# Patient Record
Sex: FEMALE | HISPANIC OR LATINO
[De-identification: names, ages, dates, MRNs, and addresses within clinical notes are randomized per-mention and may not be internally consistent; named-entity substitution may affect disease eponyms.]

---

## 2017-01-16 ENCOUNTER — CHARTING TRANS (OUTPATIENT)
Dept: OTHER | Age: 67
End: 2017-01-16

## 2017-01-31 ENCOUNTER — CHARTING TRANS (OUTPATIENT)
Dept: OTHER | Age: 67
End: 2017-01-31

## 2017-02-01 ENCOUNTER — CHARTING TRANS (OUTPATIENT)
Dept: OTHER | Age: 67
End: 2017-02-01

## 2017-02-02 ENCOUNTER — IMAGING SERVICES (OUTPATIENT)
Dept: OTHER | Age: 67
End: 2017-02-02

## 2017-02-02 ENCOUNTER — CHARTING TRANS (OUTPATIENT)
Dept: OTHER | Age: 67
End: 2017-02-02

## 2017-02-06 ENCOUNTER — CHARTING TRANS (OUTPATIENT)
Dept: OTHER | Age: 67
End: 2017-02-06

## 2017-02-07 ENCOUNTER — CHARTING TRANS (OUTPATIENT)
Dept: OTHER | Age: 67
End: 2017-02-07

## 2017-02-08 ENCOUNTER — LAB SERVICES (OUTPATIENT)
Dept: OTHER | Age: 67
End: 2017-02-08

## 2017-02-08 LAB
INTERNATIONAL NORMAL: 1.03 (ref 0.8–1.1)
PROTHROMBIN TIME (PATIENT): 11 SECONDS (ref 8.9–11.9)

## 2017-02-09 ENCOUNTER — CHARTING TRANS (OUTPATIENT)
Dept: OTHER | Age: 67
End: 2017-02-09

## 2017-02-13 ENCOUNTER — CHARTING TRANS (OUTPATIENT)
Dept: OTHER | Age: 67
End: 2017-02-13

## 2017-02-15 ENCOUNTER — CHARTING TRANS (OUTPATIENT)
Dept: OTHER | Age: 67
End: 2017-02-15

## 2017-02-20 ENCOUNTER — IMAGING SERVICES (OUTPATIENT)
Dept: OTHER | Age: 67
End: 2017-02-20

## 2017-02-28 ENCOUNTER — CHARTING TRANS (OUTPATIENT)
Dept: OTHER | Age: 67
End: 2017-02-28

## 2017-03-06 ENCOUNTER — CHARTING TRANS (OUTPATIENT)
Dept: ORTHOPEDICS | Age: 67
End: 2017-03-06

## 2017-03-06 ENCOUNTER — IMAGING SERVICES (OUTPATIENT)
Dept: OTHER | Age: 67
End: 2017-03-06

## 2017-03-06 ENCOUNTER — CHARTING TRANS (OUTPATIENT)
Dept: OTHER | Age: 67
End: 2017-03-06

## 2017-03-08 ENCOUNTER — CHARTING TRANS (OUTPATIENT)
Dept: OTHER | Age: 67
End: 2017-03-08

## 2017-03-10 ENCOUNTER — CHARTING TRANS (OUTPATIENT)
Dept: OTHER | Age: 67
End: 2017-03-10

## 2017-03-13 ENCOUNTER — TELEPHONE (OUTPATIENT)
Dept: NEUROLOGY | Facility: CLINIC | Age: 67
End: 2017-03-13

## 2017-03-13 NOTE — TELEPHONE ENCOUNTER
Last office visit on 9-26-16 and FMLA forms completed at that time. Pending appointment on 3-20-17. To complete new forms after visit. Daughter notified. Paperwork to pending appointment file.

## 2017-03-15 ENCOUNTER — CHARTING TRANS (OUTPATIENT)
Dept: OTHER | Age: 67
End: 2017-03-15

## 2017-03-16 ENCOUNTER — CHARTING TRANS (OUTPATIENT)
Dept: OTHER | Age: 67
End: 2017-03-16

## 2017-03-20 ENCOUNTER — OFFICE VISIT (OUTPATIENT)
Dept: NEUROLOGY | Facility: CLINIC | Age: 67
End: 2017-03-20

## 2017-03-20 VITALS — DIASTOLIC BLOOD PRESSURE: 66 MMHG | SYSTOLIC BLOOD PRESSURE: 110 MMHG | HEART RATE: 94 BPM | RESPIRATION RATE: 18 BRPM

## 2017-03-20 DIAGNOSIS — R41.3 MEMORY LOSS: ICD-10-CM

## 2017-03-20 DIAGNOSIS — R94.01 ABNORMAL ELECTROENCEPHALOGRAM (EEG): ICD-10-CM

## 2017-03-20 DIAGNOSIS — G40.209 PARTIAL SYMPTOMATIC EPILEPSY WITH COMPLEX PARTIAL SEIZURES, NOT INTRACTABLE, WITHOUT STATUS EPILEPTICUS (HCC): Primary | ICD-10-CM

## 2017-03-20 PROCEDURE — 99213 OFFICE O/P EST LOW 20 MIN: CPT | Performed by: PHYSICIAN ASSISTANT

## 2017-03-20 RX ORDER — OXCARBAZEPINE 600 MG/1
600 TABLET, FILM COATED ORAL 2 TIMES DAILY
Qty: 60 TABLET | Refills: 5 | Status: SHIPPED | OUTPATIENT
Start: 2017-03-20 | End: 2017-09-18

## 2017-03-20 RX ORDER — OXCARBAZEPINE 300 MG/1
300 TABLET, FILM COATED ORAL 2 TIMES DAILY
COMMUNITY
Start: 2017-03-02 | End: 2017-03-20 | Stop reason: DRUGHIGH

## 2017-03-20 NOTE — PATIENT INSTRUCTIONS
Refill policies:    • Allow 2 business days for refills; controlled substances may take longer.   • Contact your pharmacy at least 5 days prior to running out of medication and have them send an electronic request or submit request through the “request re your physician has recommended that you have a procedure or additional testing performed. [facility]) will contact your insurance carrier to obtain pre-certification or prior authorization.     Unfortunately, RU has seen an increas

## 2017-03-21 NOTE — TELEPHONE ENCOUNTER
Completed, signed form faxed (with office notes) to [facility]. Copy to scan.

## 2017-03-23 ENCOUNTER — CHARTING TRANS (OUTPATIENT)
Dept: OTHER | Age: 67
End: 2017-03-23

## 2017-04-06 ENCOUNTER — CHARTING TRANS (OUTPATIENT)
Dept: OTHER | Age: 67
End: 2017-04-06

## 2017-04-06 ENCOUNTER — IMAGING SERVICES (OUTPATIENT)
Dept: OTHER | Age: 67
End: 2017-04-06

## 2017-04-14 ENCOUNTER — CHARTING TRANS (OUTPATIENT)
Dept: OTHER | Age: 67
End: 2017-04-14

## 2017-04-17 ENCOUNTER — TELEPHONE (OUTPATIENT)
Dept: NEUROLOGY | Facility: CLINIC | Age: 67
End: 2017-04-17

## 2017-04-19 ENCOUNTER — CHARTING TRANS (OUTPATIENT)
Dept: OTHER | Age: 67
End: 2017-04-19

## 2017-04-19 ENCOUNTER — TELEPHONE (OUTPATIENT)
Dept: NEUROLOGY | Facility: CLINIC | Age: 67
End: 2017-04-19

## 2017-05-19 ENCOUNTER — TELEPHONE (OUTPATIENT)
Dept: NEUROLOGY | Facility: CLINIC | Age: 67
End: 2017-05-19

## 2017-06-05 NOTE — TELEPHONE ENCOUNTER
Per Last refill on 05/06/16 Dr Henry Ny was only going to give a one month supply until patient found a pcp. Spoke to pharmacist and informed Dr Renay Castillo note.  Verbalized understanding

## 2017-06-06 RX ORDER — FLUOXETINE HYDROCHLORIDE 40 MG/1
CAPSULE ORAL
Qty: 30 CAPSULE | Refills: 0 | OUTPATIENT
Start: 2017-06-06

## 2017-06-07 ENCOUNTER — CHARTING TRANS (OUTPATIENT)
Dept: PODIATRY | Age: 67
End: 2017-06-07

## 2017-06-07 ENCOUNTER — IMAGING SERVICES (OUTPATIENT)
Dept: OTHER | Age: 67
End: 2017-06-07

## 2017-06-23 ENCOUNTER — TELEPHONE (OUTPATIENT)
Dept: NEUROLOGY | Facility: CLINIC | Age: 67
End: 2017-06-23

## 2017-06-23 NOTE — TELEPHONE ENCOUNTER
Per Epic review, labs ordered at [address] [place] [address] 3/20/17. Reminder letters sent to patient to have blood work completed on 4/19/17 & 5/19/17. No results as of today.  Letter sent to home address notifying patient of order cancellation and to contact our office if she c

## 2017-08-28 ENCOUNTER — OFFICE VISIT (OUTPATIENT)
Dept: NEUROLOGY | Facility: CLINIC | Age: 67
End: 2017-08-28

## 2017-08-28 VITALS
HEART RATE: 78 BPM | WEIGHT: 166 LBS | SYSTOLIC BLOOD PRESSURE: 130 MMHG | BODY MASS INDEX: 29.41 KG/M2 | DIASTOLIC BLOOD PRESSURE: 60 MMHG | RESPIRATION RATE: 16 BRPM | HEIGHT: 63 IN

## 2017-08-28 DIAGNOSIS — G40.209 PARTIAL SYMPTOMATIC EPILEPSY WITH COMPLEX PARTIAL SEIZURES, NOT INTRACTABLE, WITHOUT STATUS EPILEPTICUS (HCC): ICD-10-CM

## 2017-08-28 DIAGNOSIS — F41.9 ANXIETY AND DEPRESSION: ICD-10-CM

## 2017-08-28 DIAGNOSIS — R94.01 ABNORMAL ELECTROENCEPHALOGRAM (EEG): Primary | ICD-10-CM

## 2017-08-28 DIAGNOSIS — F32.A ANXIETY AND DEPRESSION: ICD-10-CM

## 2017-08-28 PROCEDURE — 99213 OFFICE O/P EST LOW 20 MIN: CPT | Performed by: PHYSICIAN ASSISTANT

## 2017-08-28 NOTE — PATIENT INSTRUCTIONS
Refill policies:    • Allow 2-3 business days for refills; controlled substances may take longer.   • Contact your pharmacy at least 5 days prior to running out of medication and have them send an electronic request or submit request through the [facility] have a procedure or additional testing performed. [facility]) will contact your insurance carrier to obtain pre-certification or prior authorization.     Unfortunately, RU has seen an increase in denial of payment even though the p

## 2017-08-28 NOTE — PROGRESS NOTES
HPI:    Patient ID: Dennise Fang is a 74year old female. HPI     Patient is a 74year old female here for follow-up of seizures and memory issues. Patient denies any seizures since her last visit.     She is here today with complaints of  feeling depre cranial nerve deficit. She displays a negative Romberg sign. Coordination and gait normal.   Reflex Scores:       Tricep reflexes are 2+ on the right side and 2+ on the left side. Bicep reflexes are 2+ on the right side and 2+ on the left side.

## 2017-09-18 DIAGNOSIS — G40.219 PARTIAL SYMPTOMATIC EPILEPSY WITH COMPLEX PARTIAL SEIZURES, INTRACTABLE, WITHOUT STATUS EPILEPTICUS (HCC): Primary | ICD-10-CM

## 2017-09-18 RX ORDER — OXCARBAZEPINE 600 MG/1
TABLET, FILM COATED ORAL
Qty: 60 TABLET | Refills: 5 | Status: SHIPPED | OUTPATIENT
Start: 2017-09-18 | End: 2018-03-21

## 2017-09-18 NOTE — TELEPHONE ENCOUNTER
Medication: Oxcarbazepine    Date of last refill: 3/20/2017  [place] for [place] medication    Last office visit: 8/28/2017  Due back to clinic per last office note:  RTC in 6 months, due back 2/2018  Date next office vi

## 2017-09-27 ENCOUNTER — TELEPHONE (OUTPATIENT)
Dept: NEUROLOGY | Facility: CLINIC | Age: 67
End: 2017-09-27

## 2017-10-04 ENCOUNTER — TELEPHONE (OUTPATIENT)
Dept: NEUROLOGY | Facility: CLINIC | Age: 67
End: 2017-10-04

## 2017-10-06 ENCOUNTER — TELEPHONE (OUTPATIENT)
Dept: NEUROLOGY | Facility: CLINIC | Age: 67
End: 2017-10-06

## 2017-10-16 ENCOUNTER — TELEPHONE (OUTPATIENT)
Dept: NEUROLOGY | Facility: CLINIC | Age: 67
End: 2017-10-16

## 2018-03-12 NOTE — PROGRESS NOTES
HPI:    Patient ID: Jenny Dash is a 74year old female. HPI     Patient is a 74year old female here today with her daugher for follow-up of seizures and mild cognitive impairment. She denies any seizures. She has never had a witnessed seizure.  Reny Randall titration pack Disp: 60 tablet Rfl: 2   OXCARBAZEPINE 600 MG Oral Tab TAKE 1 TABLET BY MOUTH TWICE DAILY Disp: 60 tablet Rfl: 5     Allergies:  Lactose                 Diarrhea  Penicillin G            Hives    Blood pressure 139/63, pulse 87, resp.  rate 1 depression      Patient and daughter with complaints of worsening memory will get MRI Brain to evaluate and check vitamin V51, folic acid and tsh levels. In the mean time will start namenda. Seizures- Denies any seizures. Will check a trileptal level.

## 2018-03-12 NOTE — PATIENT INSTRUCTIONS
Refill policies:    • Allow 2-3 business days for refills; controlled substances may take longer.   • Contact your pharmacy at least 5 days prior to running out of medication and have them send an electronic request or submit request through the [facility] recommended that you have a procedure or additional testing performed. [facility]) will contact your insurance carrier to obtain pre-certification or prior authorization.     Unfortunately, [facility] has seen an increase in denial of paym

## 2018-03-20 ENCOUNTER — TELEPHONE (OUTPATIENT)
Dept: NEUROLOGY | Facility: CLINIC | Age: 68
End: 2018-03-20

## 2018-03-20 NOTE — TELEPHONE ENCOUNTER
Paperwork provided to Carie at office visit completed and signed. Faxed to [facility], copy to scan.

## 2018-03-20 NOTE — TELEPHONE ENCOUNTER
Daughter calling on status of paperwork. Form faxed to [facility] this morning. Daughter asking if Dr. Anthony Currie signed off on the form, it needs his signature, they will not accept Carie's.

## 2018-03-21 DIAGNOSIS — G40.219 PARTIAL SYMPTOMATIC EPILEPSY WITH COMPLEX PARTIAL SEIZURES, INTRACTABLE, WITHOUT STATUS EPILEPTICUS (HCC): ICD-10-CM

## 2018-03-21 NOTE — TELEPHONE ENCOUNTER
Medication: Oxcarbazepine 600 mg    Date of last refill: 09/18/17 with 5 addt refills  [place]     Last office visit: 3/12/2018  Due back to clinic per last office note:  RTN in 6 months  Date next office visit scheduled

## 2018-03-22 RX ORDER — OXCARBAZEPINE 600 MG/1
TABLET, FILM COATED ORAL
Qty: 60 TABLET | Refills: 5 | Status: SHIPPED | OUTPATIENT
Start: 2018-03-22 | End: 2018-08-22

## 2018-04-11 ENCOUNTER — TELEPHONE (OUTPATIENT)
Dept: NEUROLOGY | Facility: CLINIC | Age: 68
End: 2018-04-11

## 2018-04-11 ENCOUNTER — HOSPITAL ENCOUNTER (OUTPATIENT)
Dept: MRI IMAGING | Facility: HOSPITAL | Age: 68
Discharge: HOME | End: 2018-04-11
Attending: PHYSICIAN ASSISTANT
Payer: COMMERCIAL

## 2018-04-11 ENCOUNTER — APPOINTMENT (OUTPATIENT)
Dept: LAB | Facility: HOSPITAL | Age: 68
End: 2018-04-11
Attending: PHYSICIAN ASSISTANT
Payer: COMMERCIAL

## 2018-04-11 DIAGNOSIS — G31.84 MILD COGNITIVE IMPAIRMENT: ICD-10-CM

## 2018-04-11 DIAGNOSIS — Z51.81 MEDICATION MONITORING ENCOUNTER: ICD-10-CM

## 2018-04-11 DIAGNOSIS — R94.01 ABNORMAL ELECTROENCEPHALOGRAM (EEG): ICD-10-CM

## 2018-04-11 DIAGNOSIS — R41.3 MEMORY LOSS: ICD-10-CM

## 2018-04-11 PROCEDURE — 82607 VITAMIN B-12: CPT

## 2018-04-11 PROCEDURE — A9576 INJ PROHANCE MULTIPACK: HCPCS | Performed by: PHYSICIAN ASSISTANT

## 2018-04-11 PROCEDURE — 36415 COLL VENOUS BLD VENIPUNCTURE: CPT

## 2018-04-11 PROCEDURE — 70553 MRI BRAIN STEM W/O & W/DYE: CPT | Performed by: PHYSICIAN ASSISTANT

## 2018-04-11 PROCEDURE — 84443 ASSAY THYROID STIM HORMONE: CPT

## 2018-04-11 PROCEDURE — 82746 ASSAY OF FOLIC ACID SERUM: CPT

## 2018-04-11 PROCEDURE — 80183 DRUG SCRN QUANT OXCARBAZEPIN: CPT

## 2018-04-11 NOTE — TELEPHONE ENCOUNTER
Received ANIBAL consent form and verified signature. Faxed records from 9/27/16 to present to Attn:  [address] [facility] at [phone]. Fax receipt confirmed at 11:17 am.  Copy of ANIBAL sent to scanning.

## 2018-04-11 NOTE — TELEPHONE ENCOUNTER
Requesting status on medical record request.  We have no record of a request from Hubert Pereira. Per [place], the request was faxed to [phone]. Explained that is not one of our fax numbers and have no idea where that would be received.   Gave he

## 2018-04-16 ENCOUNTER — TELEPHONE (OUTPATIENT)
Dept: NEUROLOGY | Facility: CLINIC | Age: 68
End: 2018-04-16

## 2018-04-16 NOTE — TELEPHONE ENCOUNTER
Tried calling patient but no answer and unable to leave a message. Spoke with daughter Lashawn Perry and informed her that MRI Brain showed mild increase in chronic ischemic changes. Would recommend monitoring cholesterol, blood pressure and blood sugars.  Take A

## 2018-04-17 ENCOUNTER — TELEPHONE (OUTPATIENT)
Dept: NEUROLOGY | Facility: CLINIC | Age: 68
End: 2018-04-17

## 2018-04-19 NOTE — TELEPHONE ENCOUNTER
Yonny Alvarenga has court hearing for disability next Tuesday; daughter Ashley Leonard would like a letter similar to past in which Dr. Tameka Randolph notes she should be on disability. Letter pended for MD signature.

## 2018-04-24 ENCOUNTER — TELEPHONE (OUTPATIENT)
Dept: NEUROLOGY | Facility: CLINIC | Age: 68
End: 2018-04-24

## 2018-04-24 NOTE — TELEPHONE ENCOUNTER
Pharmacy confirmed receipt of 3/12/18 Namenda 10 mg BID rx. Pt picked up the medication on 3/12/18, and again on 4/10/18. Pt still has one refill on file.

## 2018-04-24 NOTE — TELEPHONE ENCOUNTER
Spoke to daughter and relayed information received from pharmacy. Per daughter, pt has been picking up the medication. She will discuss with her mom.

## 2018-04-24 NOTE — TELEPHONE ENCOUNTER
Namenda dosing clarified. Patient has completed taper dose and is taking Namenda 10mg BID. Daughter verbalized understanding. Daughter asked for diagnosis of patient. Primary and secondary diagnosis from last office visit provided.

## 2018-04-24 NOTE — TELEPHONE ENCOUNTER
Pt thought she needed an appt to get Namenda rx. Explained it was sent to the pharmacy in March. She does not think the pharmacy has the prescription. Asked if we could call and confirm. Tiny on file, [address] in [place] is correct.

## 2018-05-29 DIAGNOSIS — R41.9 COGNITIVE COMPLAINTS: Primary | ICD-10-CM

## 2018-05-29 RX ORDER — MEMANTINE HYDROCHLORIDE 10 MG/1
TABLET ORAL
Qty: 60 TABLET | Refills: 2 | Status: SHIPPED | OUTPATIENT
Start: 2018-05-29 | End: 2018-08-18

## 2018-05-29 NOTE — TELEPHONE ENCOUNTER
Medication: Memantine 10 mg     Date of last refill: 09/18/17 with 5 addt refills  [place]      Last office visit: 3/12/2018  Due back to clinic per last office note:  RTN in 6 months  Date next office visit scheduled:

## 2018-06-05 NOTE — LETTER
"Radha Corona  :  1950  DOS: 2018  MRN: [id number]    Sports Medicine Clinic Visit    PCP: Myrna Staples    Radha Corona is a 68 year old female who is seen in consultation at the request of  Myrna Staples C.N.P. presenting with chronic left knee pain.    Injury: Walking down her outside steps, stumbled, possibly twisting over left knee ~ 6 months ago (2017).  Pain located over left deep medial knee, nonradiating.  Additional Features:  Positive: swelling and weakness.  Symptoms are better with Rest.  Symptoms are worse with: walking, prolonged standing, descending stairs, going from sit to stand.  Other evaluation and/or treatments so far consists of: Tylenol, Rest and PCP consult.  Recent imaging completed: No recent imaging completed.  Prior History of related problems: none    Social History: retired     Review of Systems  Musculoskeletal: as above  Remainder of review of systems is negative including constitutional, CV, pulmonary, GI, Skin and Neurologic except as noted in HPI or medical history.    Past Medical History:   Diagnosis Date     Abscess of intestine 8/3/07    ptl colectomy     Basal cell carcinoma      Displacement of lumbar intervertebral disc without myelopathy 8/3/07     GERD (gastroesophageal reflux disease)      Malignant neoplasm of breast (female), unspecified site 8/3/07     Migraine, unspecified, without mention of intractable migraine without mention of status migrainosus      Other and unspecified hyperlipidemia 8/3/07     Past Surgical History:   Procedure Laterality Date     ARTHROSCOPY KNEE RT/LT  2005     BREAST LUMPECTOMY, RT/LT  06    Right Breast Lumpectomy     C ORAL SURGERY PROCEDURE      wisdom teeth extraction     COLECTOMY      Reversal of Colostomy      D & C       HC SACROPLASTY      Hx of Spine Surgery L4-5     HYSTERECTOMY, DIOMEDES  2005       Objective  /82  Ht 5' 9\" (1.753 m)  Wt 225 lb (102.1 kg)  BMI 33.23 " 18          Darlene Ocasio  :  1950      To Whom It May Concern: This patient is being followed by my office for the diagnosis of Memory Loss and Seizures.  Due to her medical condition, she is unable to work and it is recommended that she r kg/m2    General: healthy, alert and in no distress    HEENT: no scleral icterus or conjunctival erythema   Skin: no suspicious lesions or rash. No jaundice.   CV: regular rhythm by palpation, 2+ distal pulses, no pedal edema    Resp: normal respiratory effort without conversational dyspnea   Psych: normal mood and affect    Gait: mildly antalgic, appropriate coordination and balance   Neuro: normal light touch sensory exam of the extremities. Motor strength as noted below     Right Knee exam    ROM:        Flexion 110 degrees L, 120 on R       Extension -2 degrees       Range of motion limited by pain on L medially, mildly on R anteriorly    Inspection:       no visible ecchymosis        effusion noted small on L    Skin:       no visible deformities       well perfused       capillary refill brisk    Patellar Motion:        Crepitus noted in the patellofemoral joint    Tender:        medial patellar border mild       lateral patellar border R>L       medial joint line L>R    Non Tender:         remainder of knee area        along MCL        distal IT Band        infrapatellar tendon        tibial tubercle       pes anserine bursa    Special Tests:        neg (-) varus at 0 deg and 30 deg       neg (-) valgus at 0 deg and 30 deg    Evaluation of ipsilateral kinetic chain       decreased strength with resisted abduction of the L>right hip       decreased strength with resisted extension of the L>right hip       B/l decreased quad tone and core deconditioning    Radiology  XR images independently visualized and reviewed with patient today in clinic  B/l medial compartment significant narrowing, moderate R PF DJD, milder on the right    Assessment:  1. Chronic pain of left knee    2. Bilateral primary osteoarthritis of knee        Plan:  Discussed the assessment with the patient.  Follow up: prn based on progress  Low impact activity options and PT options reviewed in detail today  Acute on chronic L knee OA flare,  b/l DJD apparent on XR  RICE and sleeve options reviewed for activity  Reviewed wt loss, activity modification and progressive increase in activity as tolerated and guided by pain  Reviewed options for potential steroid vs viscosupplementation injections and the possibility for future orthopedic referral prn  Reviewed safe and appropriate OTC medication choices, try tylenol first  Up to 3000mg daily of tylenol is generally safe, NSAID dosing and duration limitations reviewed  Discussed nature of degenerative arthrosis of the knee.   Discussed symptom treatment with ice or heat, topical treatments, and rest if needed.   We discussed modified progressive pain-free activity as tolerated  Home handouts provided and supportive care reviewed  All questions were answered today  Contact us with additional questions or concerns  Signs and sx of concern reviewed    Thanks very much for sending this nice lady to us, I will keep you updated with her progress      Skyler Schaefer DO, CAQ  Primary Care Sports Medicine  [facility]                 Disclaimer: This note consists of symbols derived from keyboarding, dictation and/or voice recognition software. As a result, there may be errors in the script that have gone undetected. Please consider this when interpreting information found in this chart.

## 2018-08-18 DIAGNOSIS — R41.9 COGNITIVE COMPLAINTS: ICD-10-CM

## 2018-08-20 RX ORDER — MEMANTINE HYDROCHLORIDE 10 MG/1
10 TABLET ORAL 2 TIMES DAILY
Qty: 60 TABLET | Refills: 2 | Status: SHIPPED | OUTPATIENT
Start: 2018-08-20 | End: 2019-01-22

## 2018-08-20 NOTE — TELEPHONE ENCOUNTER
Medication: Memantine 10 mg     Date of last refill: 05/29/18 wt 2 addt refills  [place]      Last office visit: 3/12/2018  Due back to clinic per last office note:  RTN in 6 months  Date next office visit scheduled:

## 2018-08-22 NOTE — PROGRESS NOTES
Patient here for evaluation follow up and completion of disability paperwork. Patient states her memory is progressively worse, experiencing extreme fatigue.

## 2018-08-22 NOTE — PROGRESS NOTES
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

## 2018-08-22 NOTE — PATIENT INSTRUCTIONS
Refill policies:    • Allow 2-3 business days for refills; controlled substances may take longer.   • Contact your pharmacy at least 5 days prior to running out of medication and have them send an electronic request or submit request through the “request re entire amount billed. Precertification and Prior Authorizations: If your physician has recommended that you have a procedure or additional testing performed.   [facility]) will contact your insurance carrier to obtain pre-certi

## 2018-09-24 ENCOUNTER — TELEPHONE (OUTPATIENT)
Dept: NEUROLOGY | Facility: CLINIC | Age: 68
End: 2018-09-24

## 2018-09-24 NOTE — TELEPHONE ENCOUNTER
Tried calling patient and was unable to leave a voice-mail as it never went to an answering machine it just continued to ring.

## 2018-09-24 NOTE — TELEPHONE ENCOUNTER
Iker Screen started Zoloft but is getting more depressed on it. All she wants to do is sleep. Abad Godinez doesn't know what other options are for her. She recalls patient was previously on prozac and liked it.     Will alert Carie and see if another antidepressant

## 2018-10-15 ENCOUNTER — TELEPHONE (OUTPATIENT)
Dept: NEUROLOGY | Facility: CLINIC | Age: 68
End: 2018-10-15

## 2018-10-15 NOTE — TELEPHONE ENCOUNTER
Per Epic record patient present to office visit on 8/22/18 and disability paperwork completed and returned to patient at that time, copy scanned to chart. Kathi informed, paperwork from 8/22 refaxed to [facility].  She will inform office if any additional pa

## 2018-10-15 NOTE — TELEPHONE ENCOUNTER
Daughter returning RN call. She spoke to TRAMAINE MUN[employer] [facility] [employer]. They need documentation from provider stating patient remains disabled. Similar letters have been sent in past, can include last OV note. Please fax to [phone].

## 2018-10-31 ENCOUNTER — CHARTING TRANS (OUTPATIENT)
Dept: OTHER | Age: 68
End: 2018-10-31

## 2018-11-01 ENCOUNTER — CHARTING TRANS (OUTPATIENT)
Dept: OTHER | Age: 68
End: 2018-11-01

## 2018-11-23 ENCOUNTER — IMAGING SERVICES (OUTPATIENT)
Dept: OTHER | Age: 68
End: 2018-11-23

## 2018-11-28 VITALS — BODY MASS INDEX: 24.11 KG/M2 | HEIGHT: 66 IN | WEIGHT: 150 LBS

## 2018-11-28 VITALS — WEIGHT: 167 LBS | BODY MASS INDEX: 29.59 KG/M2 | HEIGHT: 63 IN

## 2018-11-29 VITALS — WEIGHT: 167 LBS | HEIGHT: 63 IN | BODY MASS INDEX: 29.59 KG/M2

## 2019-01-22 DIAGNOSIS — R41.9 COGNITIVE COMPLAINTS: ICD-10-CM

## 2019-01-23 RX ORDER — MEMANTINE HYDROCHLORIDE 10 MG/1
TABLET ORAL
Qty: 60 TABLET | Refills: 1 | Status: SHIPPED | OUTPATIENT
Start: 2019-01-23 | End: 2019-03-18

## 2019-01-23 NOTE — TELEPHONE ENCOUNTER
Medication: Memantine 10 mg    Date of last refill: 08/20/18 with 2 addt refills  [place]     Last office visit: 8/22/2018  Due back to clinic per last office note:  RTN in 6 months  Date next office visit scheduled:  No Non Face to Face CPT code 52940/39196 applies as documented above     PROCEDURE DONE     (   ) see notes  Visits are done with \"open doors and windows\" exam rooms, except when patient requests privacy                        Progress Notes   Gavi Head

## 2019-01-24 ENCOUNTER — IMAGING SERVICES (OUTPATIENT)
Dept: OTHER | Age: 69
End: 2019-01-24

## 2019-03-18 DIAGNOSIS — R41.9 COGNITIVE COMPLAINTS: ICD-10-CM

## 2019-03-18 RX ORDER — MEMANTINE HYDROCHLORIDE 10 MG/1
TABLET ORAL
Qty: 60 TABLET | Refills: 2 | Status: SHIPPED | OUTPATIENT
Start: 2019-03-18 | End: 2019-10-07

## 2019-03-18 RX ORDER — SERTRALINE HYDROCHLORIDE 100 MG/1
TABLET, FILM COATED ORAL
Qty: 30 TABLET | Refills: 2 | Status: SHIPPED | OUTPATIENT
Start: 2019-03-18 | End: 2019-07-06

## 2019-03-18 NOTE — TELEPHONE ENCOUNTER
Medication:  Sertraline 100 mg & Memantine 10 mg     Date of last refill: 08/22/18 with 5 addt refills & 01/23/19 with 1 addt refill  [place]      Last office visit: 8/22/2018  Due back to clinic per last office note:  Mancel Skiff (  ) other records reviewed --non F2F  (  ) [facility] meetings - patient not present --non F2F  Non Face to Face CPT code 79779/11157 applies as documented above     PROCEDURE DONE     (   ) see notes  Visits are done with \"open doors and windows\" exam rooms,

## 2019-03-20 DIAGNOSIS — R41.9 COGNITIVE COMPLAINTS: ICD-10-CM

## 2019-03-20 DIAGNOSIS — R90.82 WHITE MATTER DISEASE: Primary | ICD-10-CM

## 2019-03-20 RX ORDER — MEMANTINE HYDROCHLORIDE 5 MG-10 MG
KIT ORAL
Qty: 49 TABLET | Refills: 0 | OUTPATIENT
Start: 2019-03-20

## 2019-04-03 ENCOUNTER — TELEPHONE (OUTPATIENT)
Dept: NEUROLOGY | Facility: CLINIC | Age: 69
End: 2019-04-03

## 2019-04-03 NOTE — TELEPHONE ENCOUNTER
Pt dropped off disability forms at check out. [facility] will only accept Dr. Herron Neighbor signature. Please include Carie's OV notes from today. Pt requesting they be completed by the end of the week. Placed in RN bin.

## 2019-04-03 NOTE — PROGRESS NOTES
[facility] with [address] [facility]  7/28/1950  Primary Care Provider:  Akshat Cope    4/3/2019  Accompanied visit:  () No (X) yes, by: Daughter    79year old female patient being seen for Rfl: 2  •  MEMANTINE HCL 10 MG Oral Tab, TAKE 1 TABLET BY MOUTH TWICE DAILY, Disp: 60 tablet, Rfl: 2  •  OXcarbazepine 600 MG Oral Tab, Take 1 tablet (600 mg total) by mouth 2 (two) times daily. , Disp: 60 tablet, Rfl: 5  •  Meclizine HCl 25 MG Oral Tab, Ta independently reviewed -non F2F  (  ) Case/studies discussed with other caregivers - -non F2F  (  ) Telephone time with patiern or authorized [employer]  (  ) other records reviewed --non F2F  (  ) Fam meetings - patient not present --non F2F  Non

## 2019-04-05 NOTE — TELEPHONE ENCOUNTER
Daughter checking on status of disability forms. Explained they are pending Dr. Eric Noble completion.

## 2019-04-11 NOTE — TELEPHONE ENCOUNTER
[facility] forms completed and faxed to [phone], receipt rec'd  Included:  Disability Medical report filled out by Dr VINCENT  4/3/19 LOV  Release of info Auth    Certificate of disability Benefits- no form  Sent to scan and mailed to pt daughter Kyle Brennan per FIF

## 2019-05-29 ENCOUNTER — TELEPHONE (OUTPATIENT)
Dept: NEUROLOGY | Facility: CLINIC | Age: 69
End: 2019-05-29

## 2019-05-29 NOTE — TELEPHONE ENCOUNTER
S: talked with Blaire Almanzar, she is feeling very depressed. She would like to change her medication. B: Mild Cognitive Impairment- stable.  Continue the namenda 10mg bid  Abnormal EEG- Continue the trileptal 600mg bid  Balance problems- PT order given  Anxiety/

## 2019-05-30 NOTE — TELEPHONE ENCOUNTER
Returned Carie call: notified pt to contact  Dr Leatha Rosenberg for depression  [phone], pt thankful and agreed to call

## 2019-05-31 NOTE — TELEPHONE ENCOUNTER
Pt called again. Let her now nurses in clinic and will call later this morning. Pt appeared very anxious and stated depressed.

## 2019-05-31 NOTE — TELEPHONE ENCOUNTER
Pt having several symptoms and needs a call back to discuss medications. Would like a call today and will not make it through the weekend.

## 2019-05-31 NOTE — TELEPHONE ENCOUNTER
Manoj Walden calling to report she needs stronger anti-depressant medications; she is \"freaking out\".      She is hyperventilating occasionally over the past few weeks, no triggers but happens \"out of the blue\"    She denies any suicidal feelings or intent to

## 2019-06-10 ENCOUNTER — TELEPHONE (OUTPATIENT)
Dept: NEUROLOGY | Facility: CLINIC | Age: 69
End: 2019-06-10

## 2019-06-10 NOTE — TELEPHONE ENCOUNTER
Talked with Leslie Awan, She is at home today. She feels dizziness while walking. She is noticing her balance is not stable. She almost had a fall today. Pt states \" she has to walk slowly to balance herself and her brain is going crazy\".     No other neurologi

## 2019-07-08 RX ORDER — SERTRALINE HYDROCHLORIDE 100 MG/1
TABLET, FILM COATED ORAL
Qty: 30 TABLET | Refills: 5 | Status: SHIPPED | OUTPATIENT
Start: 2019-07-08 | End: 2020-01-19

## 2019-07-08 NOTE — TELEPHONE ENCOUNTER
Medication: Sertraline 100 mg    Date of last refill: 03/18/2019 with 2 addt refills  [place]     Last office visit: 4/3/2019  Due back to clinic per last office note: RTN in 6 months  Date next office visit scheduled:

## 2019-07-22 ENCOUNTER — OFFICE VISIT (OUTPATIENT)
Dept: FAMILY MEDICINE CLINIC | Facility: CLINIC | Age: 69
End: 2019-07-22
Payer: MEDICARE

## 2019-07-22 ENCOUNTER — HOSPITAL ENCOUNTER (OUTPATIENT)
Age: 69
Discharge: STILL ADMITTED OR ADMINISTRATIVE | End: 2019-07-22

## 2019-07-22 DIAGNOSIS — S99.912A INJURY OF LEFT ANKLE, INITIAL ENCOUNTER: Primary | ICD-10-CM

## 2019-08-29 ENCOUNTER — TELEPHONE (OUTPATIENT)
Dept: NEUROLOGY | Facility: CLINIC | Age: 69
End: 2019-08-29

## 2019-08-29 NOTE — TELEPHONE ENCOUNTER
Pt has appt on October 7th with Marian Whitney, wanted to be seen sooner but nothing available. She is not feeling well and would like to speak to someone.

## 2019-09-05 NOTE — TELEPHONE ENCOUNTER
Attempted to contact patient. Phone rang for greater than 1 min and no answer/no machine. Patient will need to be called back.

## 2019-09-09 ENCOUNTER — TELEPHONE (OUTPATIENT)
Dept: NEUROLOGY | Facility: CLINIC | Age: 69
End: 2019-09-09

## 2019-09-09 NOTE — TELEPHONE ENCOUNTER
Attempted to call patient - no answer - phone rang over 1 min. Patient called in to [facility] for sick call today 9/9/2019. Patient was called multiple times regarding 8/29/2019 with no answer.

## 2019-09-10 NOTE — TELEPHONE ENCOUNTER
S: Patient calling states she has been hyperventilating and breathing through her mouth more frequently and she is concerned. B: EVARISTO Carty    Mild Cognitive Impairment- stable.  Continue the namenda 10mg bid  Abnormal EEG- Continue the trileptal

## 2019-10-07 NOTE — PROGRESS NOTES
[facility] with [address] [facility]  7/28/1950  Primary Care Provider:  Courtney Andre    10/7/2019  Accompanied visit: Yes- daughter      81year old female patient being seen for: Abnormal E HIVES         EXAM:  /70 (BP Location: Left arm, Patient Position: Sitting, Cuff Size: adult)   Pulse 74   Resp 16   Ht 63\"   Wt 157 lb (71.2 kg)   BMI 27.81 kg/m²   Looks stated age  Pink conjunctiva anicteric sclerae, moist mucosa  No LAD, neck marks

## 2019-10-09 ENCOUNTER — TELEPHONE (OUTPATIENT)
Dept: NEUROLOGY | Facility: CLINIC | Age: 69
End: 2019-10-09

## 2019-10-09 NOTE — TELEPHONE ENCOUNTER
Disability form received, requiring MD completion and signature. Endorsed to Dr. Dane Polanco for completion and signature.

## 2019-11-04 ENCOUNTER — TELEPHONE (OUTPATIENT)
Dept: NEUROLOGY | Facility: CLINIC | Age: 69
End: 2019-11-04

## 2019-11-04 DIAGNOSIS — G31.84 MILD COGNITIVE IMPAIRMENT: ICD-10-CM

## 2019-11-04 DIAGNOSIS — R94.01 ABNORMAL ELECTROENCEPHALOGRAM (EEG): ICD-10-CM

## 2019-11-04 RX ORDER — OXCARBAZEPINE 600 MG/1
600 TABLET, FILM COATED ORAL 2 TIMES DAILY
Qty: 180 TABLET | Refills: 3 | Status: SHIPPED | OUTPATIENT
Start: 2019-11-04

## 2019-11-04 RX ORDER — MEMANTINE HYDROCHLORIDE 10 MG/1
10 TABLET ORAL 2 TIMES DAILY
Qty: 180 TABLET | Refills: 3 | Status: SHIPPED | OUTPATIENT
Start: 2019-11-04

## 2019-11-04 NOTE — TELEPHONE ENCOUNTER
Daughter stated that mother had moved to her home do to her memory issues and never  her medications  Oxycarbazepine and Memantine) can we please send a new Rx to her new Pharmacy [facility] in [facility].  Thanks

## 2019-11-04 NOTE — TELEPHONE ENCOUNTER
Medication: Oxcarbazepine 600 mg and Memantine HCL 10 mg, patient want to send it to [facility] in [address] [place] [address].

## 2020-01-16 ENCOUNTER — TELEPHONE (OUTPATIENT)
Dept: NEUROLOGY | Facility: CLINIC | Age: 70
End: 2020-01-16

## 2020-01-16 NOTE — PROGRESS NOTES
[facility] with [address] [facility]  7/28/1950  Primary Care Provider:  Fely Castrejon    1/16/2020  Accompanied visit: Yes- daughter      81year old female patient being seen for: Abnormal E • Seizure disorder ([facility])     1990's last seizure       Medications:      Current Outpatient Medications:   •  FLUoxetine HCl 10 MG Oral Cap, Take 10 mg by mouth daily. , Disp: , Rfl:   •  Donepezil HCl (ARICEPT) 5 MG Oral Tab, Take 1 tab po daily, Disp: 3 it. She expressed full understanding. Continue the namenda 10mg bid and start aricept 5mg daily.  Discussed with daughter and patient that they need to come up with a plan as she needs to have daily monitoring to make sure she is taking her medication, help

## 2020-01-16 NOTE — TELEPHONE ENCOUNTER
Pts daughter called and would like to speak to [employer] about her mom's recent office visit today.       Call Sasha Burgos the daughter

## 2020-01-20 NOTE — TELEPHONE ENCOUNTER
Patient daughter Linda Mansfield, would like to talk with Noe Guevara about her mother LOV. She would prefer to talk with Carie instead of RACHEL. Will notify Noe Guevara to call Linda Mansfield.

## 2020-01-23 NOTE — TELEPHONE ENCOUNTER
Daughter, Chadd Finley, returned call. She no longer needs to speak to anyone.   Please disregard her previous request.

## 2020-01-24 ENCOUNTER — TELEPHONE (OUTPATIENT)
Dept: SURGERY | Facility: CLINIC | Age: 70
End: 2020-01-24

## 2020-01-24 NOTE — TELEPHONE ENCOUNTER
Routed to provider to determine if Peer to Peer should be scheduled. Per following notes, PEER to PEER must be done by 1/28/2020; A peer to peer can be scheduled to speak with the Medical Director by calling [phone] option#4.  Has to be scheduled

## 2020-01-24 NOTE — TELEPHONE ENCOUNTER
PET scan denied    Denial reason:    PET is supported for the evaluation of individuals with a recent diagnosis of dementia and documented cognitive decline of at least six months who meet diagnostic criteria for both Alzheimer's disease and Frontotemporal

## 2020-02-06 NOTE — TELEPHONE ENCOUNTER
Left voice-mail message for patient's daughter Shital Lau informing her that the pet scan was denied by her mother's insurance.

## 2020-04-16 NOTE — PROGRESS NOTES
HPI:    Patient ID: Jenny Dash is a 61year old female. HPI     Patient is a 61year old female here today with her daughter for follow-up of seizures and memory issues.   Patient and daughter state that in January she was making food in the kitchen a Ordered variable height hospital bed    Diarrhea - stop the metformin see if that is causing it.  If so - stay off of it and monitor glc on insulin    If no improvement - restart metformin and check stool tests.   displays normal reflexes. No cranial nerve deficit. Coordination and gait normal.   Reflex Scores:       Tricep reflexes are 2+ on the right side and 2+ on the left side. Bicep reflexes are 2+ on the right side and 2+ on the left side.        Brachior

## 2020-05-08 ENCOUNTER — TELEPHONE (OUTPATIENT)
Dept: NEUROLOGY | Facility: CLINIC | Age: 70
End: 2020-05-08

## 2020-05-12 NOTE — TELEPHONE ENCOUNTER
Attempted 5 times to convert pt appt on 4/12/84 due to public health concern.   Left message cxl appt and return our call to rs

## 2020-09-28 RX ORDER — SERTRALINE HYDROCHLORIDE 100 MG/1
TABLET, FILM COATED ORAL
Qty: 30 TABLET | Refills: 2 | Status: SHIPPED | OUTPATIENT
Start: 2020-09-28

## 2020-09-28 NOTE — TELEPHONE ENCOUNTER
Medication: Sertralinne 100 mg    Date of last refill:07/08/2019 with 5 addt refills  [place]     Last office visit: 01/16/20  Due back to clinic per last office note:  RTN in 4 months  Date next office visit scheduled:

## 2021-06-21 ENCOUNTER — TELEPHONE (OUTPATIENT)
Dept: NEUROLOGY | Facility: CLINIC | Age: 71
End: 2021-06-21

## 2021-06-21 NOTE — TELEPHONE ENCOUNTER
Spoke with daughter who is unsure what to do with mom. Has medical POA and real estate POA. Mother is in denial about memory issues and not paying bills etc.  Would like to have pre-conversation with Stephan Randolph PA-C before appt on 7/26.   Has already spo

## 2021-06-21 NOTE — TELEPHONE ENCOUNTER
Patient's daughter (hipaa verified) called regarding her mother, and that her dementia symptoms are worsening. She is requesting a call from [address] to discuss navigating care. Patient is scheduled for a follow up at the end of July.      Please advise

## 2021-07-25 PROCEDURE — 93010 ELECTROCARDIOGRAM REPORT: CPT | Performed by: INTERNAL MEDICINE

## 2021-09-13 PROCEDURE — 93010 ELECTROCARDIOGRAM REPORT: CPT | Performed by: INTERNAL MEDICINE

## 2022-01-25 NOTE — PROGRESS NOTES
Lm on vm regarding surgery dates    Pt presents with a left ankle injury which occurred a week ago. Pt states that she was standing in the shower and felt left ankle pain. Pt does not recall turning the ankle and injuring it.   Pt states that the pain and bruising has worsened over the week

## 2023-01-20 ENCOUNTER — TELEPHONE (OUTPATIENT)
Dept: NEUROLOGY | Facility: CLINIC | Age: 73
End: 2023-01-20

## 2023-01-20 NOTE — TELEPHONE ENCOUNTER
RN tried to call the daughter back and there was no name or number to identify the person. RN did not leave a message. RN will try to call back on Monday.

## 2023-01-23 NOTE — TELEPHONE ENCOUNTER
Spoke with daughter Yaima Ross per consent, who is asking for medical records while preparing to admit mother to LTC facility nearer to daughters home. Provided daughter with number for Medical Records Department.

## 2023-10-16 NOTE — TELEPHONE ENCOUNTER
Told daughter that one of the nurses will call her back on Monday with that information Melolabial Interpolation Flap Text: A decision was made to reconstruct the defect utilizing an interpolation axial flap and a staged reconstruction.  A telfa template was made of the defect.  This telfa template was then used to outline the melolabial interpolation flap.  The donor area for the pedicle flap was then injected with anesthesia.  The flap was excised through the skin and subcutaneous tissue down to the layer of the underlying musculature.  The pedicle flap was carefully excised within this deep plane to maintain its blood supply.  The edges of the donor site were undermined.   The donor site was closed in a primary fashion.  The pedicle was then rotated into position and sutured.  Once the tube was sutured into place, adequate blood supply was confirmed with blanching and refill.  The pedicle was then wrapped with xeroform gauze and dressed appropriately with a telfa and gauze bandage to ensure continued blood supply and protect the attached pedicle.

## 2024-04-03 NOTE — TELEPHONE ENCOUNTER
Form endorsed to Dr. Geni Lew for completion and signature (requires completion by Dr. Geni Lew only). Patient informed of lab orders being placed

## 2024-04-21 ENCOUNTER — APPOINTMENT (OUTPATIENT)
Dept: CT IMAGING | Age: 74
End: 2024-04-21
Attending: EMERGENCY MEDICINE

## 2024-04-21 ENCOUNTER — HOSPITAL ENCOUNTER (EMERGENCY)
Age: 74
Discharge: HOME | End: 2024-04-21
Attending: EMERGENCY MEDICINE

## 2024-04-21 VITALS
OXYGEN SATURATION: 97 % | BODY MASS INDEX: 25.02 KG/M2 | DIASTOLIC BLOOD PRESSURE: 65 MMHG | WEIGHT: 155 LBS | RESPIRATION RATE: 14 BRPM | HEART RATE: 84 BPM | TEMPERATURE: 97.4 F | SYSTOLIC BLOOD PRESSURE: 123 MMHG

## 2024-04-21 DIAGNOSIS — S09.90XA INJURY OF HEAD, INITIAL ENCOUNTER: ICD-10-CM
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
## 2024-04-21 PROCEDURE — 99285 EMERGENCY DEPT VISIT HI MDM: CPT

## 2024-04-21 PROCEDURE — 70450 CT HEAD/BRAIN W/O DYE: CPT

## 2024-04-21 PROCEDURE — 10003579 HB TRAUMA W/O CRITICAL CARE

## (undated) NOTE — LETTER
10/16/18    Darlene Ocasio  :  1950        To Whom It May Concern:     This patient is being followed by my office for the diagnosis of Memory Loss and Seizures.  Due to her medical condition, she is unable to work and it is recommended that she

## (undated) NOTE — LETTER
10/04/2017    Dear Isabel Fenton,    We are contacting you from [facility] office. Our office has sent multiple reminders to have testing completed and we have not yet received results.     Testing ordered: 08/28/17 & 09/04/17    Although it is important to

## (undated) NOTE — Clinical Note
05/19/2017        [id number]      Dear Praveena Kahn,     We are contacting you from [facility] office. Your health is important to us.  We have not received test results for additional tests that your provi

## (undated) NOTE — Clinical Note
04/19/2017        [address] [id number]      Dear Leslie Awan,     We are contacting you from [facility] office. Your health is important to us.  We have not received test results for additional tests that your provi

## (undated) NOTE — Clinical Note
06/23/2017    Dear Praveena Kahn,      We are contacting you from the office of Jaylen Roberson. Our office has sent multiple reminders to have testing completed and we have not yet received results.       Testing ordered: CMP, CBC & Trileptal labs      Although it i

## (undated) NOTE — LETTER
09/27/2017      [id number] [facility] [address]      Dear Isidro Mott,     We are contacting you from [facility] office. Your health is important to us.  We have not received test results for additional tests that your provide

## (undated) NOTE — MR AVS SNAPSHOT
[address] [facility] [address] [phone]               Thank you for choosing us for your health care visit with LORENZO Lutz.   We are glad to serve you and happy to provide you with this summary of your visit ? Refills are not addressed on weekends; covering physicians do not authorize routine medications on weekends. ? No narcotics or controlled substances are refilled after noon on Fridays or by on call physicians.   ? By law, narcotics cannot be faxed or jt the procedure/test has been pre-certified. You are strongly encouraged to contact your insurance carrier to verify that your procedure/test has been approved and is a COVERED benefit.   Although the [facility] staff does its due diligence, the insurance carrier g Choose whole grain products Foods high in sodium   Water is best for hydration Fast food.    Eat at home when possible     Tips for increasing your physical activity – Adults who are physically active are less likely to develop some chronic diseases than ad

## (undated) NOTE — LETTER
04/11/2018                [address] [facility] [address]      Dear Herlinda Gardner,     We are contacting you from [facility] office. Your health is important to us.  We have not received test results for additional tests that yo